# Patient Record
Sex: MALE | Race: WHITE
[De-identification: names, ages, dates, MRNs, and addresses within clinical notes are randomized per-mention and may not be internally consistent; named-entity substitution may affect disease eponyms.]

---

## 2019-07-18 ENCOUNTER — HOSPITAL ENCOUNTER (OUTPATIENT)
Dept: HOSPITAL 61 - PCVCIMAG | Age: 59
Discharge: HOME | End: 2019-07-18
Attending: INTERNAL MEDICINE
Payer: COMMERCIAL

## 2019-07-18 DIAGNOSIS — I10: Primary | ICD-10-CM

## 2019-07-18 DIAGNOSIS — R07.9: ICD-10-CM

## 2019-07-18 PROCEDURE — 93306 TTE W/DOPPLER COMPLETE: CPT

## 2019-07-19 NOTE — PCVCIMAG
--------------- APPROVED REPORT --------------





Study performed:  07/18/2019 15:49:01



EXAM: Comprehensive 2D, Doppler, and color-flow 

Echocardiogram

Patient Location: Echo lab

Status:  routine



BSA:         2.44

HR: 62 bpmBP:          122/80 mmHg

Rhythm: NSR



Other Information 

Study Quality: Adequate



Risk Factors: 

Cardiac Risk Factors:  HTN



2D Dimensions

IVSd:  12.73 (7-11mm)LVOT Diam:  21.75 (18-24mm) 

LVDd:  54.56 mm

PWd:  9.63 (7-11mm)Ascending Ao:  36.10 (22-36mm)

LVDs:  36.54 (25-40mm)

Left Atrium:  43.14 (27-40mm)

Aortic Root:  32.92 mm

LV Single Plane 4CH:  60.61 %

LV Single Plane 2CH:  69.63 %

Biplane EF:  65.7 %



Volumes

Left Atrial Volume (Systole)

Single Plane 4CH:  72.25 mLSingle Plane 2CH:  37.11 mL

LA ESV Index:  32.00 mL/m2



Aortic Valve

AoV Peak Jose.:  1.22 m/s

AO Peak Gr.:  5.99 mmHg



Mitral Valve

E/A Ratio:  1.0

MV Decel. Time:  147.36 ms

MV E Max Jose.:  0.87 m/s

MV A Jose.:  0.86 m/s



TDI

E/Lateral E':  7.91E/Medial E':  10.88

Medial E' Jose.:  0.08 m/s

Lateral E' Jose.:  0.11 m/s



Pulmonary Valve

PV Peak Gr.:  1.76 mmHg



Pulmonary Vein

P Vein S:    0.67 m/sP Vein A:  0.29 m/s

P Vein D:   0.48 m/sP Vein A Dur.:  110.7 msec

P Vein S/D Ratio:  1.40



Left Ventricle

The left ventricle is normal size. There is normal LV segmental wall 

motion. There is normal left ventricular wall thickness. Left 

ventricular systolic function is normal. The left ventricular 

ejection fraction is within the normal range. LVEF is 60%. The left 

ventricular diastolic function is normal.



Right Ventricle

The right ventricle is normal size. The right ventricular systolic 

function is normal.



Atria

The left atrium size is normal. The right atrium size is 

normal.



Aortic Valve

The aortic valve is normal in structure. No aortic regurgitation is 

present. There is no aortic valvular stenosis.



Mitral Valve

The mitral valve is normal in structure. There is no mitral valve 

regurgitation noted. No evidence of mitral valve stenosis.



Tricuspid Valve

The tricuspid valve is normal in structure. There is no tricuspid 

valve regurgitation noted.



Pulmonic Valve

The pulmonary valve is normal in structure. There is no pulmonic 

valvular regurgitation.



Great Vessels

The aortic root is normal in size. IVC is normal in size and 

collapses >50% with inspiration.



Pericardium

There is no pericardial effusion.



<Conclusion>

The left ventricle is normal size.

LVEF is 60%.

The aortic valve is normal in structure.

The mitral valve is normal in structure.

The tricuspid valve is normal in structure.

The pulmonary valve is normal in structure.

There is no pericardial effusion.